# Patient Record
Sex: MALE | Race: WHITE | NOT HISPANIC OR LATINO | Employment: PART TIME | URBAN - METROPOLITAN AREA
[De-identification: names, ages, dates, MRNs, and addresses within clinical notes are randomized per-mention and may not be internally consistent; named-entity substitution may affect disease eponyms.]

---

## 2018-06-14 ENCOUNTER — HOSPITAL ENCOUNTER (EMERGENCY)
Facility: HOSPITAL | Age: 83
Discharge: HOME | End: 2018-06-14
Attending: EMERGENCY MEDICINE
Payer: MEDICARE

## 2018-06-14 VITALS
SYSTOLIC BLOOD PRESSURE: 143 MMHG | DIASTOLIC BLOOD PRESSURE: 79 MMHG | HEART RATE: 88 BPM | WEIGHT: 194 LBS | OXYGEN SATURATION: 97 % | HEIGHT: 67 IN | RESPIRATION RATE: 18 BRPM | TEMPERATURE: 99 F | BODY MASS INDEX: 30.45 KG/M2

## 2018-06-14 DIAGNOSIS — M19.041 OSTEOARTHRITIS OF RIGHT HAND, UNSPECIFIED OSTEOARTHRITIS TYPE: Primary | ICD-10-CM

## 2018-06-14 DIAGNOSIS — M79.641 RIGHT HAND PAIN: ICD-10-CM

## 2018-06-14 PROBLEM — M51.36 DEGENERATION OF LUMBAR INTERVERTEBRAL DISC: Status: ACTIVE | Noted: 2018-06-14

## 2018-06-14 PROBLEM — E11.9 DIABETES MELLITUS: Status: ACTIVE | Noted: 2018-06-14

## 2018-06-14 PROBLEM — E78.5 HYPERLIPIDEMIA: Status: ACTIVE | Noted: 2018-06-14

## 2018-06-14 PROBLEM — I10 HYPERTENSION: Status: ACTIVE | Noted: 2018-06-14

## 2018-06-14 PROCEDURE — 99283 EMERGENCY DEPT VISIT LOW MDM: CPT

## 2018-06-14 RX ORDER — CELECOXIB 200 MG/1
200 CAPSULE ORAL 2 TIMES DAILY
COMMUNITY
Start: 2017-06-27

## 2018-06-14 RX ORDER — SAXAGLIPTIN AND METFORMIN HYDROCHLORIDE 500; 5 MG/1; MG/1
TABLET, FILM COATED, EXTENDED RELEASE ORAL
COMMUNITY
Start: 2017-06-20

## 2018-06-14 RX ORDER — ATORVASTATIN CALCIUM 40 MG/1
40 TABLET, FILM COATED ORAL DAILY
COMMUNITY
Start: 2018-04-09 | End: 2022-03-06 | Stop reason: ALTCHOICE

## 2018-06-14 RX ORDER — ESCITALOPRAM OXALATE 10 MG/1
10 TABLET ORAL DAILY
COMMUNITY
End: 2018-06-14 | Stop reason: DRUGHIGH

## 2018-06-14 RX ORDER — METOPROLOL TARTRATE 50 MG/1
50 TABLET ORAL 2 TIMES DAILY
COMMUNITY

## 2018-06-14 RX ORDER — FLUTICASONE PROPIONATE 50 MCG
2 SPRAY, SUSPENSION (ML) NASAL DAILY
COMMUNITY
Start: 2018-06-07

## 2018-06-14 RX ORDER — GLIPIZIDE 10 MG/1
TABLET ORAL
COMMUNITY
Start: 2015-01-10

## 2018-06-14 RX ORDER — HYDROCODONE BITARTRATE AND ACETAMINOPHEN 5; 325 MG/1; MG/1
1 TABLET ORAL
Qty: 10 TABLET | Refills: 0 | Status: SHIPPED | OUTPATIENT
Start: 2018-06-14 | End: 2018-07-02

## 2018-06-14 RX ORDER — ASPIRIN 81 MG/1
81 TABLET ORAL DAILY
COMMUNITY

## 2018-06-14 RX ORDER — ESCITALOPRAM OXALATE 20 MG/1
20 TABLET ORAL DAILY
COMMUNITY
Start: 2018-06-13

## 2018-06-14 NOTE — ED PROVIDER NOTES
Encounter Date: 6/14/2018       History     Chief Complaint   Patient presents with    Hand Pain     Patient reports right hand pain for several days denies injury reports difficult to sleep and wanted it checked.        The history is provided by the patient.   Hand Injury    The incident occurred two days ago. The incident occurred at home. The injury mechanism is unknown (patient has history of arthritis). The pain is present in the right hand. The quality of the pain is described as throbbing and aching. The pain is at a severity of 8/10. The pain has been fluctuating since the incident. Pertinent negatives include no fever and no malaise/fatigue. The symptoms are aggravated by movement, use and palpation. He has tried NSAIDs (Celebrex) for the symptoms. The treatment provided no relief.       PCP:    Trey Eden MD        Review of patient's allergies indicates:   Allergen Reactions    Prednisone      Hiccups     Past Medical History:   Diagnosis Date    Depression     Diabetes mellitus     Hearing aid worn     Hypertension      Past Surgical History:   Procedure Laterality Date    TESTICLE SURGERY Left     removed the left testicle    TOTAL KNEE ARTHROPLASTY Left      History reviewed. No pertinent family history.  Social History   Substance Use Topics    Smoking status: Never Smoker    Smokeless tobacco: Never Used    Alcohol use No     Review of Systems   Constitutional: Negative for fever and malaise/fatigue.   HENT: Negative for congestion and sore throat.    Respiratory: Negative for chest tightness and shortness of breath.    Cardiovascular: Negative for chest pain.   Gastrointestinal: Negative for abdominal pain and nausea.   Genitourinary: Negative for dysuria.   Musculoskeletal: Positive for arthralgias (right hand). Negative for back pain.   Skin: Negative for rash and wound.   Neurological: Negative for weakness and headaches.   Hematological: Does not bruise/bleed easily.        Physical Exam     Initial Vitals [06/14/18 1349]   BP Pulse Resp Temp SpO2   (!) 140/75 91 18 98.8 °F (37.1 °C) 98 %      MAP       --         Physical Exam    Nursing note and vitals reviewed.  Constitutional: He appears well-developed and well-nourished. He is cooperative. He does not appear ill. No distress.   HENT:   Head: Normocephalic and atraumatic.   Right Ear: External ear normal.   Left Ear: External ear normal.   Nose: Nose normal.   Mouth/Throat: Uvula is midline, oropharynx is clear and moist and mucous membranes are normal.   Eyes: Conjunctivae, EOM and lids are normal. Pupils are equal, round, and reactive to light.   Neck: Trachea normal and normal range of motion. Neck supple.   Cardiovascular: Normal rate, regular rhythm, intact distal pulses and normal pulses.   Pulmonary/Chest: Effort normal. No accessory muscle usage. No respiratory distress.   Abdominal: Soft. He exhibits no distension and no mass. There is no tenderness. There is no rebound and no guarding.   Musculoskeletal: He exhibits no edema.        Right hand: He exhibits decreased range of motion (secondary to pain), tenderness (generalized tenderness - no crepitus or obvious deformity noted - mild swelling noted to dorsal surface - neurovascular intact distally), bony tenderness and swelling (mild swelling noted near 1st and 2nd metacarpals). He exhibits normal capillary refill. Normal sensation noted.        Hands:  Neurological: He is alert and oriented to person, place, and time. He has normal strength. Gait normal. GCS eye subscore is 4. GCS verbal subscore is 5. GCS motor subscore is 6.   Neurovascular intact to all extremities.    Skin: Skin is warm, dry and intact. Capillary refill takes less than 2 seconds. No abrasion, no bruising, no ecchymosis and no rash noted.   Psychiatric: He has a normal mood and affect. His speech is normal and behavior is normal. Cognition and memory are normal.         ED Course   Splint  "Application  Date/Time: 2018 2:45 PM  Performed by: PERLA VARGAS  Authorized by: PERLA VARGAS   Consent Done: Yes  Consent: Verbal consent obtained.  Risks and benefits: risks, benefits and alternatives were discussed  Consent given by: patient  Site marked: the operative site was marked  Imaging studies: imaging studies available  Patient identity confirmed: , MRN, name and verbally with patient  Time out: Immediately prior to procedure a "time out" was called to verify the correct patient, procedure, equipment, support staff and site/side marked as required.  Location details: right hand  Splint type: ace bandage.  Supplies used: elastic bandage  Post-procedure: The splinted body part was neurovascularly unchanged following the procedure.  Patient tolerance: Patient tolerated the procedure well with no immediate complications             ED Imaging Results:   Imaging Results          X-Ray Hand 3 view Right (Final result)  Result time 18 14:34:20    Final result by ELIO Montoya Sr., MD (18 14:34:20)                 Impression:      1. There are moderate osteoarthritic changes in the 1st metacarpocarpal joint.  2. There is no acute fracture visualized.  3. There is a 2 mm well corticated osseous density projected medial to the distal interphalangeal joint of the right index finger.  This is characteristic of an old fracture fragment or an unfused accessory ossification center.      Electronically signed by: John Montoya MD  Date:    2018  Time:    14:34             Narrative:    EXAMINATION:  XR HAND COMPLETE 3 VIEW RIGHT    CLINICAL HISTORY:  Right Hand Pain;    COMPARISON:  None    FINDINGS:  There is no acute fracture visualized.  There is a 2 mm well corticated osseous density projected medial to the distal interphalangeal joint of the right index finger.  There is no dislocation.  There are moderate osteoarthritic changes in the 1st metacarpocarpal joint.      " "                          ED Course Vitals  Vitals:    06/14/18 1349 06/14/18 1452   BP: (!) 140/75 (!) 143/79   BP Location: Left arm Left arm   Patient Position: Sitting Sitting   Pulse: 91 88   Resp: 18 18   Temp: 98.8 °F (37.1 °C) 99.3 °F (37.4 °C)   TempSrc: Oral Oral   SpO2: 98% 97%   Weight: 88 kg (194 lb)    Height: 5' 7" (1.702 m)          1445 HOURS RE-EVALUATION & DISPOSITION:   Reassessment at the time of disposition demonstrates that the patient is resting comfortably in no acute distress.  He has remained hemodynamically stable throughout the entire ED visit and is without objective evidence for acute process requiring urgent intervention or hospitalization. I discussed test results and provided counseling to patient with regard to condition, the treatment plan, specific conditions for return, and the importance of follow up.  Answered questions at this time. The patient is stable for discharge.       X-Rays:   Independently Interpreted Readings:   Other Readings:  Radiographs of the right hand reveal no acute findings - osteoarthritic changes noted per radiologist.       Medical Decision Making:   Clinical Tests:   Radiological Study: Ordered and Reviewed                      Clinical Impression:       ICD-10-CM ICD-9-CM   1. Osteoarthritis of right hand, unspecified osteoarthritis type M19.041 715.94   2. Right hand pain M79.641 729.5           Disposition:   Disposition: Discharged  Condition: Stable  I discussed with patient that the evaluation in the emergency department does not suggest any emergent or life threatening medical condition requiring immediate intervention beyond what was provided in the ED, and I believe patient is safe for discharge.  Regardless, an unremarkable evaluation in the ED does not preclude the development or presence of a serious of life threatening condition. As such, patient was instructed to return immediately for any worsening or change in current symptoms. I also " discussed the results of my evaluation and diagnostics with patient and he concurs with the evaluation and management plan.  Detailed written and verbal instructions provided to patient and he expressed a verbal understanding of the discharge instructions and overall management plan. Reiterated the importance of medication administration and safety and advised patient to follow up with primary care provider in 3-5 days or sooner if needed.  Also advised patient to return to the ER for any complications.     Regarding OSTEOARTHRITIS, for treatment, I advised patient to: take over-the-counter acetaminophen, ibuprofen, or aspirin for pain if indicated; limit activities and get plenty of rest when in pain; avoid motions and activities that cause strain on your joints, such as heavy exercise and lifting; when picking things up, bend at the hips and knees; do not twist your back while lifting; use crutches, a cane, or a walker if weak or have unsteady gait; decrease stress and strain on the affected joint; wear low-heeled shoes to decrease the pain of arthritic toes and feet and strain on the ankle, knee, and hip joint; participate in muscle strengthening exercises when tolerable; maintain/obtain healthy weight; sleep on a firm mattress or put a 1/2 to 1 inch piece of plywood between the mattress and box springs; sleep on back with a pillow under knees to decrease tension on back or in a position of comfort; use a heating pad (turned on low) to decrease pain and swelling for 15 to 20 minutes hourly;  massage the muscles around the joint to relieve pain and stiffness; and avoid cold temperatures or outdoor activity in cold weather or cold temperatures. I also informed patient to take all medications as prescribed and to always bring current list and bottles of medications to ED as well as visits to primary care provider or specialist.           Discharge Medication List as of 6/14/2018  2:47 PM      START taking these  medications    Details   HYDROcodone-acetaminophen (NORCO) 5-325 mg per tablet Take 1 tablet by mouth every 4 to 6 hours as needed for Pain., Starting Thu 6/14/2018, Print             Follow-up Information     Call  Trey Eden MD.    Specialty:  Internal Medicine  Why:  If symptoms worsen or as needed  Contact information:  [id number] [facility] [address]  [phone]                                Vimal Foley NP  06/14/18 9847

## 2018-06-14 NOTE — DISCHARGE INSTRUCTIONS
The radiographs revealed no obvious fracture or dislocation.     Continue taking the Celebrex as prescribed by Dr. Eden.    Do not drive or operate heavy machinery after taking pain medication.  Only take as needed.

## 2018-07-02 ENCOUNTER — HOSPITAL ENCOUNTER (EMERGENCY)
Facility: HOSPITAL | Age: 83
Discharge: HOME | End: 2018-07-03
Attending: EMERGENCY MEDICINE
Payer: MEDICARE

## 2018-07-02 DIAGNOSIS — R79.89 ELEVATED TROPONIN: ICD-10-CM

## 2018-07-02 DIAGNOSIS — Z76.0 REPEAT PRESCRIPTION ISSUE: ICD-10-CM

## 2018-07-02 DIAGNOSIS — R00.0 RAPID HEART RATE: ICD-10-CM

## 2018-07-02 DIAGNOSIS — I47.10 SVT (SUPRAVENTRICULAR TACHYCARDIA): Primary | ICD-10-CM

## 2018-07-02 LAB
ALBUMIN SERPL BCP-MCNC: 3.9 G/DL
ALP SERPL-CCNC: 107 U/L
ALT SERPL W/O P-5'-P-CCNC: 20 U/L
ANION GAP SERPL CALC-SCNC: 7 MMOL/L
AST SERPL-CCNC: 23 U/L
BASOPHILS # BLD AUTO: 0.03 K/UL
BASOPHILS NFR BLD: 0.3 %
BILIRUB SERPL-MCNC: 1.7 MG/DL
BILIRUB UR QL STRIP: NEGATIVE
BNP SERPL-MCNC: 202 PG/ML
BUN SERPL-MCNC: 18 MG/DL
CALCIUM SERPL-MCNC: 9.9 MG/DL
CHLORIDE SERPL-SCNC: 105 MMOL/L
CK SERPL-CCNC: 222 U/L
CLARITY UR REFRACT.AUTO: CLEAR
CO2 SERPL-SCNC: 24 MMOL/L
COLOR UR AUTO: YELLOW
CREAT SERPL-MCNC: 1.3 MG/DL
DIFFERENTIAL METHOD: ABNORMAL
EOSINOPHIL # BLD AUTO: 0.2 K/UL
EOSINOPHIL NFR BLD: 1.8 %
ERYTHROCYTE [DISTWIDTH] IN BLOOD BY AUTOMATED COUNT: 13.9 %
EST. GFR  (AFRICAN AMERICAN): 57.1 ML/MIN/1.73 M^2
EST. GFR  (NON AFRICAN AMERICAN): 49.4 ML/MIN/1.73 M^2
GLUCOSE SERPL-MCNC: 169 MG/DL
GLUCOSE UR QL STRIP: NEGATIVE
HCT VFR BLD AUTO: 40.5 %
HGB BLD-MCNC: 13.4 G/DL
HGB UR QL STRIP: NEGATIVE
KETONES UR QL STRIP: NEGATIVE
LEUKOCYTE ESTERASE UR QL STRIP: NEGATIVE
LYMPHOCYTES # BLD AUTO: 1.8 K/UL
LYMPHOCYTES NFR BLD: 19.4 %
MCH RBC QN AUTO: 27.9 PG
MCHC RBC AUTO-ENTMCNC: 33.1 G/DL
MCV RBC AUTO: 84 FL
MONOCYTES # BLD AUTO: 1 K/UL
MONOCYTES NFR BLD: 11.4 %
NEUTROPHILS # BLD AUTO: 6.1 K/UL
NEUTROPHILS NFR BLD: 66.8 %
NITRITE UR QL STRIP: NEGATIVE
PH UR STRIP: 7 [PH] (ref 5–8)
PLATELET # BLD AUTO: 233 K/UL
PMV BLD AUTO: 11.6 FL
POTASSIUM SERPL-SCNC: 4.3 MMOL/L
PROT SERPL-MCNC: 8 G/DL
PROT UR QL STRIP: NEGATIVE
RBC # BLD AUTO: 4.8 M/UL
SODIUM SERPL-SCNC: 136 MMOL/L
SP GR UR STRIP: 1.01 (ref 1–1.03)
TROPONIN I SERPL DL<=0.01 NG/ML-MCNC: 0.44 NG/ML
TROPONIN I SERPL DL<=0.01 NG/ML-MCNC: 0.48 NG/ML
URN SPEC COLLECT METH UR: ABNORMAL
UROBILINOGEN UR STRIP-ACNC: ABNORMAL EU/DL
WBC # BLD AUTO: 9.06 K/UL

## 2018-07-02 PROCEDURE — 83880 ASSAY OF NATRIURETIC PEPTIDE: CPT

## 2018-07-02 PROCEDURE — 96361 HYDRATE IV INFUSION ADD-ON: CPT

## 2018-07-02 PROCEDURE — 63600175 PHARM REV CODE 636 W HCPCS: Performed by: EMERGENCY MEDICINE

## 2018-07-02 PROCEDURE — 93010 ELECTROCARDIOGRAM REPORT: CPT | Mod: 77,,, | Performed by: INTERNAL MEDICINE

## 2018-07-02 PROCEDURE — 84484 ASSAY OF TROPONIN QUANT: CPT

## 2018-07-02 PROCEDURE — 85025 COMPLETE CBC W/AUTO DIFF WBC: CPT

## 2018-07-02 PROCEDURE — 99900035 HC TECH TIME PER 15 MIN (STAT)

## 2018-07-02 PROCEDURE — 96374 THER/PROPH/DIAG INJ IV PUSH: CPT

## 2018-07-02 PROCEDURE — 93010 ELECTROCARDIOGRAM REPORT: CPT | Mod: ,,, | Performed by: NUCLEAR MEDICINE

## 2018-07-02 PROCEDURE — 99284 EMERGENCY DEPT VISIT MOD MDM: CPT | Mod: 25

## 2018-07-02 PROCEDURE — 84484 ASSAY OF TROPONIN QUANT: CPT | Mod: 91

## 2018-07-02 PROCEDURE — 93005 ELECTROCARDIOGRAM TRACING: CPT

## 2018-07-02 PROCEDURE — 96375 TX/PRO/DX INJ NEW DRUG ADDON: CPT | Mod: 59

## 2018-07-02 PROCEDURE — 82550 ASSAY OF CK (CPK): CPT

## 2018-07-02 PROCEDURE — 81003 URINALYSIS AUTO W/O SCOPE: CPT

## 2018-07-02 PROCEDURE — 80053 COMPREHEN METABOLIC PANEL: CPT

## 2018-07-02 PROCEDURE — 25500020 PHARM REV CODE 255: Performed by: EMERGENCY MEDICINE

## 2018-07-02 PROCEDURE — 25000003 PHARM REV CODE 250: Performed by: EMERGENCY MEDICINE

## 2018-07-02 PROCEDURE — 96376 TX/PRO/DX INJ SAME DRUG ADON: CPT

## 2018-07-02 RX ORDER — DILTIAZEM HYDROCHLORIDE 5 MG/ML
15 INJECTION INTRAVENOUS
Status: DISCONTINUED | OUTPATIENT
Start: 2018-07-02 | End: 2018-07-02

## 2018-07-02 RX ORDER — ADENOSINE 3 MG/ML
6 INJECTION, SOLUTION INTRAVENOUS
Status: COMPLETED | OUTPATIENT
Start: 2018-07-02 | End: 2018-07-02

## 2018-07-02 RX ORDER — AMIODARONE HYDROCHLORIDE 150 MG/3ML
150 INJECTION, SOLUTION INTRAVENOUS
Status: COMPLETED | OUTPATIENT
Start: 2018-07-02 | End: 2018-07-02

## 2018-07-02 RX ORDER — SODIUM CHLORIDE 9 MG/ML
1000 INJECTION, SOLUTION INTRAVENOUS
Status: COMPLETED | OUTPATIENT
Start: 2018-07-02 | End: 2018-07-02

## 2018-07-02 RX ORDER — DILTIAZEM HCL 1 MG/ML
5 INJECTION, SOLUTION INTRAVENOUS CONTINUOUS
Status: DISCONTINUED | OUTPATIENT
Start: 2018-07-02 | End: 2018-07-02

## 2018-07-02 RX ADMIN — ADENOSINE 6 MG: 3 INJECTION, SOLUTION INTRAVENOUS at 05:07

## 2018-07-02 RX ADMIN — SODIUM CHLORIDE 1000 ML: 0.9 INJECTION, SOLUTION INTRAVENOUS at 04:07

## 2018-07-02 RX ADMIN — IOHEXOL 100 ML: 350 INJECTION, SOLUTION INTRAVENOUS at 07:07

## 2018-07-02 RX ADMIN — SODIUM CHLORIDE 1000 ML: 0.9 INJECTION, SOLUTION INTRAVENOUS at 05:07

## 2018-07-02 RX ADMIN — AMIODARONE HYDROCHLORIDE 150 MG: 50 INJECTION, SOLUTION INTRAVENOUS at 05:07

## 2018-07-02 NOTE — ED PROVIDER NOTES
Encounter Date: 7/2/2018       History     Chief Complaint   Patient presents with    Tachycardia     pt at pcp dr. ortiz today for right elbow infection and sent over to cardiology due to elevated heart rate.     The history is provided by the patient.   Palpitations    This is a new problem. The current episode started several weeks ago. The problem occurs constantly. The problem has been unchanged. The problem is associated with an unknown factor. Associated symptoms include malaise/fatigue and weakness. Pertinent negatives include no fever, no chest pain, no exertional chest pressure, no nausea, no headaches, no back pain and no shortness of breath. He has tried nothing for the symptoms.     Review of patient's allergies indicates:   Allergen Reactions    Prednisone      Hiccups     Past Medical History:   Diagnosis Date    Coronary artery disease     Depression     Diabetes mellitus     Hearing aid worn     High cholesterol     Hypertension     Sleep apnea      Past Surgical History:   Procedure Laterality Date    CORONARY ARTERY BYPASS GRAFT      2009    TESTICLE SURGERY Left     removed the left testicle    TOTAL KNEE ARTHROPLASTY Left      History reviewed. No pertinent family history.  Social History   Substance Use Topics    Smoking status: Never Smoker    Smokeless tobacco: Never Used    Alcohol use No     Review of Systems   Constitutional: Positive for malaise/fatigue. Negative for fever.   HENT: Negative for sore throat.    Respiratory: Negative for shortness of breath.    Cardiovascular: Positive for palpitations. Negative for chest pain.   Gastrointestinal: Negative for nausea.   Genitourinary: Negative for dysuria.   Musculoskeletal: Negative for back pain.   Skin: Negative for rash.   Neurological: Positive for weakness. Negative for headaches.   Hematological: Does not bruise/bleed easily.       Physical Exam     Initial Vitals [07/02/18 1629]   BP Pulse Resp Temp SpO2   (!)  145/103 (!) 139 20 98.8 °F (37.1 °C) 98 %      MAP       --         Physical Exam    Nursing note and vitals reviewed.  Constitutional: He appears well-developed and well-nourished. No distress.   Elderly     HENT:   Head: Normocephalic and atraumatic.   Mouth/Throat: Oropharynx is clear and moist.   Eyes: Conjunctivae and EOM are normal. Pupils are equal, round, and reactive to light.   Neck: Normal range of motion. Neck supple.   Cardiovascular: Regular rhythm and normal heart sounds. Tachycardia present.  Exam reveals no gallop and no friction rub.    No murmur heard.  Pulmonary/Chest: Breath sounds normal. No respiratory distress. He has no wheezes. He has no rhonchi. He has no rales.   Abdominal: Soft. Bowel sounds are normal. He exhibits no distension and no mass. There is no tenderness. There is no rebound and no guarding.   Musculoskeletal: Normal range of motion. He exhibits no edema.   Neurological: He is alert and oriented to person, place, and time. He has normal strength.   Skin: Skin is warm and dry. No rash noted.   Psychiatric: He has a normal mood and affect. Thought content normal.         ED Course   Procedures  Labs Reviewed   CBC W/ AUTO DIFFERENTIAL - Abnormal; Notable for the following:        Result Value    Hemoglobin 13.4 (*)     All other components within normal limits   COMPREHENSIVE METABOLIC PANEL - Abnormal; Notable for the following:     Glucose 169 (*)     Total Bilirubin 1.7 (*)     Anion Gap 7 (*)     eGFR if  57.1 (*)     eGFR if non  49.4 (*)     All other components within normal limits   URINALYSIS, REFLEX TO URINE CULTURE - Abnormal; Notable for the following:     Urobilinogen, UA 2.0-3.0 (*)     All other components within normal limits    Narrative:     Preferred Collection Type->Urine, Clean Catch   B-TYPE NATRIURETIC PEPTIDE - Abnormal; Notable for the following:      (*)     All other components within normal limits   CK - Abnormal;  Notable for the following:      (*)     All other components within normal limits   TROPONIN I - Abnormal; Notable for the following:     Troponin I 0.479 (*)     All other components within normal limits   TROPONIN I - Abnormal; Notable for the following:     Troponin I 0.444 (*)     All other components within normal limits     EKG Readings: (Independently Interpreted)   Rhythm: Supraventricular Tachycardia (SVT).   Other EKG Interpretations: Repeat EKG at 17:13 A Fib following adenosine, then sped up to 130's-140's       Imaging Results          CTA Chest Non-Coronary (PE Study) (Final result)  Result time 07/02/18 20:05:16    Final result by John Bennett MD (07/02/18 20:05:16)                 Impression:      1. Negative for pulmonary embolism.  2. 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  3. Cardiomegaly.  4. Bilateral renal cysts.  All CT scans at this facility use dose modulation, iterative reconstruction and/or weight based dosing when appropriate to reduce radiation dose to as low as reasonably achievable.      Electronically signed by: John Bennett MD  Date:    07/02/2018  Time:    20:05             Narrative:    EXAMINATION:  CTA CHEST NON CORONARY    CLINICAL HISTORY:  dyspnea;    TECHNIQUE:  Low dose axial images, sagittal and coronal reformations were obtained from the thoracic inlet to the lung bases following administration of intravenous contrast.  Contrast timing was optimized to evaluate the pulmonary arteries.  3D reformatted images were performed, reviewed and archived.    COMPARISON:  None    FINDINGS:  Exam is mildly degraded due to respiratory motion artifact.  There is no evidence of pulmonary embolus.    The heart is normal in size.  There is atherosclerotic change of the aorta with bulbous, focal aneurysmal dilatation of the posterior aspect of the aortic arch measuring up to 3.8 cm in  maximal cross-sectional dimension.  The arch then tapers to normal caliber with the mid descending thoracic aorta measuring up to 2 point 9 cm in maximal cross-sectional dimension.  Although the aorta is not well opacified, there is no evidence of dissection.  The heart is enlarged.  No pathologic adenopathy identified throughout the chest.    Right lung is clear.  The left lung is clear.    Visualized upper abdomen demonstrates a normal appearance of the liver, gallbladder, spleen, pancreas and adrenal glands.  Bilateral renal cysts are present.    Degenerative changes of the spine are present.                               X-Ray Chest AP Portable (Final result)  Result time 07/02/18 17:24:45    Final result by John Bennett MD (07/02/18 17:24:45)                 Impression:      Cardiomegaly.  No acute findings.      Electronically signed by: John Bennett MD  Date:    07/02/2018  Time:    17:24             Narrative:    EXAMINATION:  XR CHEST AP PORTABLE    CLINICAL HISTORY:  palpitations;    COMPARISON:  None    FINDINGS:  Lungs are clear.  Cardiac silhouette is enlarged with postoperative changes.Moderate levoscoliosis of the thoracolumbar spine is present..                              ED Vital Signs:  Vitals:    07/02/18 1737 07/02/18 1801 07/02/18 1831 07/02/18 1901   BP: 137/79 122/69 136/69 137/75   Pulse: 83 75 75 79   Resp: 18 14 20 17   Temp:       TempSrc:       SpO2: 99% 99% 98% 98%   Weight:       Height:        07/02/18 1949 07/02/18 2002 07/02/18 2031 07/02/18 2101   BP: 135/78 (!) 145/72 127/68 138/72   Pulse: (!) 133 82 76 79   Resp: 18 18 13 18   Temp:       TempSrc:       SpO2: (!) 88% 98% 98% 98%   Weight:       Height:        07/02/18 2131 07/02/18 2201 07/02/18 2231 07/02/18 2300   BP: (!) 141/73 136/70 (!) 144/81    Pulse: 77 78 78    Resp: 18 20 18 20   Temp:   98.2 °F (36.8 °C)    TempSrc:       SpO2: 98% 99% 99%    Weight:       Height:        07/02/18 2302 07/02/18 2332  07/03/18 0002   BP: 135/68 (!) 177/81 (!) 141/72   Pulse: 74 97 77   Resp: 20 18 18   Temp:      TempSrc:      SpO2: 96% 98% 99%   Weight:      Height:            Abnormal Lab Results:  Labs Reviewed   CBC W/ AUTO DIFFERENTIAL - Abnormal; Notable for the following:        Result Value    Hemoglobin 13.4 (*)     All other components within normal limits   COMPREHENSIVE METABOLIC PANEL - Abnormal; Notable for the following:     Glucose 169 (*)     Total Bilirubin 1.7 (*)     Anion Gap 7 (*)     eGFR if  57.1 (*)     eGFR if non  49.4 (*)     All other components within normal limits   URINALYSIS, REFLEX TO URINE CULTURE - Abnormal; Notable for the following:     Urobilinogen, UA 2.0-3.0 (*)     All other components within normal limits    Narrative:     Preferred Collection Type->Urine, Clean Catch   B-TYPE NATRIURETIC PEPTIDE - Abnormal; Notable for the following:      (*)     All other components within normal limits   CK - Abnormal; Notable for the following:      (*)     All other components within normal limits   TROPONIN I - Abnormal; Notable for the following:     Troponin I 0.479 (*)     All other components within normal limits   TROPONIN I - Abnormal; Notable for the following:     Troponin I 0.444 (*)     All other components within normal limits          All Lab Results:  Results for orders placed or performed during the hospital encounter of 07/02/18   CBC auto differential   Result Value Ref Range    WBC 9.06 3.90 - 12.70 K/uL    RBC 4.80 4.60 - 6.20 M/uL    Hemoglobin 13.4 (L) 14.0 - 18.0 g/dL    Hematocrit 40.5 40.0 - 54.0 %    MCV 84 82 - 98 fL    MCH 27.9 27.0 - 31.0 pg    MCHC 33.1 32.0 - 36.0 g/dL    RDW 13.9 11.5 - 14.5 %    Platelets 233 150 - 350 K/uL    MPV 11.6 9.2 - 12.9 fL    Gran # (ANC) 6.1 1.8 - 7.7 K/uL    Lymph # 1.8 1.0 - 4.8 K/uL    Mono # 1.0 0.3 - 1.0 K/uL    Eos # 0.2 0.0 - 0.5 K/uL    Baso # 0.03 0.00 - 0.20 K/uL    Gran% 66.8 38.0 -  73.0 %    Lymph% 19.4 18.0 - 48.0 %    Mono% 11.4 4.0 - 15.0 %    Eosinophil% 1.8 0.0 - 8.0 %    Basophil% 0.3 0.0 - 1.9 %    Differential Method Automated    Comprehensive metabolic panel   Result Value Ref Range    Sodium 136 136 - 145 mmol/L    Potassium 4.3 3.5 - 5.1 mmol/L    Chloride 105 95 - 110 mmol/L    CO2 24 23 - 29 mmol/L    Glucose 169 (H) 70 - 110 mg/dL    BUN, Bld 18 8 - 23 mg/dL    Creatinine 1.3 0.5 - 1.4 mg/dL    Calcium 9.9 8.7 - 10.5 mg/dL    Total Protein 8.0 6.0 - 8.4 g/dL    Albumin 3.9 3.5 - 5.2 g/dL    Total Bilirubin 1.7 (H) 0.1 - 1.0 mg/dL    Alkaline Phosphatase 107 55 - 135 U/L    AST 23 10 - 40 U/L    ALT 20 10 - 44 U/L    Anion Gap 7 (L) 8 - 16 mmol/L    eGFR if African American 57.1 (A) >60 mL/min/1.73 m^2    eGFR if non  49.4 (A) >60 mL/min/1.73 m^2   Urinalysis, Reflex to Urine Culture Urine, Clean Catch   Result Value Ref Range    Specimen UA Urine, Clean Catch     Color, UA Yellow Yellow, Straw, Samina    Appearance, UA Clear Clear    pH, UA 7.0 5.0 - 8.0    Specific Gravity, UA 1.010 1.005 - 1.030    Protein, UA Negative Negative    Glucose, UA Negative Negative    Ketones, UA Negative Negative    Bilirubin (UA) Negative Negative    Occult Blood UA Negative Negative    Nitrite, UA Negative Negative    Urobilinogen, UA 2.0-3.0 (A) <2.0 EU/dL    Leukocytes, UA Negative Negative   Brain natriuretic peptide   Result Value Ref Range     (H) 0 - 99 pg/mL   CK   Result Value Ref Range     (H) 20 - 200 U/L   Troponin I   Result Value Ref Range    Troponin I 0.479 (H) 0.000 - 0.026 ng/mL   Troponin I   Result Value Ref Range    Troponin I 0.444 (H) 0.000 - 0.026 ng/mL           Imaging Results:  Imaging Results          CTA Chest Non-Coronary (PE Study) (Final result)  Result time 07/02/18 20:05:16    Final result by John Bennett MD (07/02/18 20:05:16)                 Impression:      1. Negative for pulmonary embolism.  2. Focal bulbous aneurysmal  dilatation the posterior aortic arch measuring up to 3.8 cm in maximal cross-section dimension.  3. Cardiomegaly.  4. Bilateral renal cysts.  All CT scans at this facility use dose modulation, iterative reconstruction and/or weight based dosing when appropriate to reduce radiation dose to as low as reasonably achievable.      Electronically signed by: John Bennett MD  Date:    07/02/2018  Time:    20:05             Narrative:    EXAMINATION:  CTA CHEST NON CORONARY    CLINICAL HISTORY:  dyspnea;    TECHNIQUE:  Low dose axial images, sagittal and coronal reformations were obtained from the thoracic inlet to the lung bases following administration of intravenous contrast.  Contrast timing was optimized to evaluate the pulmonary arteries.  3D reformatted images were performed, reviewed and archived.    COMPARISON:  None    FINDINGS:  Exam is mildly degraded due to respiratory motion artifact.  There is no evidence of pulmonary embolus.    The heart is normal in size.  There is atherosclerotic change of the aorta with bulbous, focal aneurysmal dilatation of the posterior aspect of the aortic arch measuring up to 3.8 cm in maximal cross-sectional dimension.  The arch then tapers to normal caliber with the mid descending thoracic aorta measuring up to 2 point 9 cm in maximal cross-sectional dimension.  Although the aorta is not well opacified, there is no evidence of dissection.  The heart is enlarged.  No pathologic adenopathy identified throughout the chest.    Right lung is clear.  The left lung is clear.    Visualized upper abdomen demonstrates a normal appearance of the liver, gallbladder, spleen, pancreas and adrenal glands.  Bilateral renal cysts are present.    Degenerative changes of the spine are present.                               X-Ray Chest AP Portable (Final result)  Result time 07/02/18 17:24:45    Final result by John Bennett MD (07/02/18 17:24:45)                 Impression:       Cardiomegaly.  No acute findings.      Electronically signed by: John Bennett MD  Date:    07/02/2018  Time:    17:24             Narrative:    EXAMINATION:  XR CHEST AP PORTABLE    CLINICAL HISTORY:  palpitations;    COMPARISON:  None    FINDINGS:  Lungs are clear.  Cardiac silhouette is enlarged with postoperative changes.Moderate levoscoliosis of the thoracolumbar spine is present..                               The Emergency Provider reviewed the vital signs and test results, which are outlined above.    ED Discussions:  5:31 PM: Re-evaluated pt. I have discussed test results, shared treatment plan, and the need for admission with patient and family at bedside. Pt and family express understanding at this time and agree with all information. All questions answered. Pt and family have no further questions or concerns at this time. Pt is ready for admit.    17:38 Patient spontaneously converted to a rate of 85 with a NSR Will call his cardiologist    17:40 Discussed case with Dr. Callahan (cardiology) Asked that I give the patient Amiodarone IV and discharge to follow up with him this week. He has already called in prescription of amiodarone and Eliquis for the patient.     17:45  While discussing discharge and plan of care per Dr. Callahan, the patient's heart rate increased to 130's again.  Will give IV amiodarone and monitor heart rate and rhythm.      The patient was received from the off-going emergency room physician Dr Pelayo at 6:00PM.  All pertinent details presently available from the patient encounter were discussed along with the expected plan for disposition.      Patient has maintained an appropriate NSR since the transition and there have been no recurrent symptoms.  Repeat troponin is unremarkable.  We will proceed with plans per his cardiologist for outpatient FU with Eliquis and Amiodarone.                            Clinical Impression:       ICD-10-CM ICD-9-CM   1. SVT (supraventricular  tachycardia) I47.1 427.89   2. Rapid heart rate R00.0 785.0   3. Repeat prescription issue Z76.0 V68.1   4. Elevated troponin R74.8 790.6                            Bryn Hope MD  07/03/18 0259

## 2018-07-02 NOTE — ED NOTES
Spoke w/ Feli, house supervisor at [facility]. Informed her records request. Consent signed by patient. Patient reports he has never been in the hospital in [place] and he normally sees Dr. Callahan at his clinic here in [place]. Feli states she doesn't have any information regarding patient in her system but she will reach out to medical records and see if they can help. Will return call here afterwards.

## 2018-07-03 VITALS
SYSTOLIC BLOOD PRESSURE: 141 MMHG | WEIGHT: 186.19 LBS | BODY MASS INDEX: 29.22 KG/M2 | HEART RATE: 77 BPM | TEMPERATURE: 98 F | DIASTOLIC BLOOD PRESSURE: 72 MMHG | OXYGEN SATURATION: 99 % | RESPIRATION RATE: 18 BRPM | HEIGHT: 67 IN

## 2018-07-03 NOTE — ED NOTES
Received call back from Feli, nursing supervisor at [facility], who states they are unable to access medical records. Dr. Hope notified and states OK to hold off on retrieving records until tomorrow as patient is being admitted to the hospital anyway.

## 2018-07-03 NOTE — ED NOTES
Dr. Hope informed patient & family member at bedside of the need for transfer for higher level of care. Patient is requesting transfer to [facility].
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
## 2018-07-03 NOTE — ED NOTES
Spoke misti Person, house supervisor at [facility] who requests us to fax a copy of patient's facesheet over to her. She states as soon as she receives facesheet she will call back with accepting information.

## 2018-07-03 NOTE — ED NOTES
ANNETTE House supervisor currently not answering phone calls. Will continue to try to reach out to their facility. MD aware.

## 2018-08-13 DIAGNOSIS — I44.0 FIRST DEGREE ATRIOVENTRICULAR BLOCK: ICD-10-CM

## 2018-08-13 DIAGNOSIS — I25.10 CORONARY ATHEROSCLEROSIS OF NATIVE CORONARY ARTERY: Primary | ICD-10-CM

## 2018-08-14 ENCOUNTER — LAB VISIT (OUTPATIENT)
Dept: LAB | Facility: HOSPITAL | Age: 83
End: 2018-08-14
Attending: INTERNAL MEDICINE
Payer: MEDICARE

## 2018-08-14 DIAGNOSIS — I25.10 CORONARY ATHEROSCLEROSIS OF NATIVE CORONARY ARTERY: ICD-10-CM

## 2018-08-14 DIAGNOSIS — I44.0 FIRST DEGREE ATRIOVENTRICULAR BLOCK: ICD-10-CM

## 2018-08-14 DIAGNOSIS — I25.10 CORONARY ATHEROSCLEROSIS OF NATIVE CORONARY ARTERY: Primary | ICD-10-CM

## 2018-08-14 LAB
ALBUMIN SERPL BCP-MCNC: 3.8 G/DL
ALP SERPL-CCNC: 92 U/L
ALT SERPL W/O P-5'-P-CCNC: 21 U/L
ANION GAP SERPL CALC-SCNC: 7 MMOL/L
AST SERPL-CCNC: 26 U/L
BASOPHILS # BLD AUTO: 0.03 K/UL
BASOPHILS NFR BLD: 0.5 %
BILIRUB SERPL-MCNC: 1.3 MG/DL
BUN SERPL-MCNC: 15 MG/DL
CALCIUM SERPL-MCNC: 9.4 MG/DL
CHLORIDE SERPL-SCNC: 107 MMOL/L
CO2 SERPL-SCNC: 28 MMOL/L
CREAT SERPL-MCNC: 1.4 MG/DL
DIFFERENTIAL METHOD: ABNORMAL
EOSINOPHIL # BLD AUTO: 0.2 K/UL
EOSINOPHIL NFR BLD: 3.1 %
ERYTHROCYTE [DISTWIDTH] IN BLOOD BY AUTOMATED COUNT: 14.5 %
EST. GFR  (AFRICAN AMERICAN): 52.2 ML/MIN/1.73 M^2
EST. GFR  (NON AFRICAN AMERICAN): 45.2 ML/MIN/1.73 M^2
GLUCOSE SERPL-MCNC: 195 MG/DL
HCT VFR BLD AUTO: 39.4 %
HGB BLD-MCNC: 12.9 G/DL
LYMPHOCYTES # BLD AUTO: 1.6 K/UL
LYMPHOCYTES NFR BLD: 25.2 %
MAGNESIUM SERPL-MCNC: 2.1 MG/DL
MCH RBC QN AUTO: 28.5 PG
MCHC RBC AUTO-ENTMCNC: 32.7 G/DL
MCV RBC AUTO: 87 FL
MONOCYTES # BLD AUTO: 0.7 K/UL
MONOCYTES NFR BLD: 10.4 %
NEUTROPHILS # BLD AUTO: 3.8 K/UL
NEUTROPHILS NFR BLD: 60.3 %
PHOSPHATE SERPL-MCNC: 3.2 MG/DL
PLATELET # BLD AUTO: 171 K/UL
PMV BLD AUTO: 11.3 FL
POTASSIUM SERPL-SCNC: 4.8 MMOL/L
PROT SERPL-MCNC: 7.8 G/DL
RBC # BLD AUTO: 4.53 M/UL
SODIUM SERPL-SCNC: 142 MMOL/L
T4 FREE SERPL-MCNC: 0.99 NG/DL
TSH SERPL DL<=0.005 MIU/L-ACNC: 1.68 UIU/ML
WBC # BLD AUTO: 6.35 K/UL

## 2018-08-14 PROCEDURE — 85025 COMPLETE CBC W/AUTO DIFF WBC: CPT | Mod: PO

## 2018-08-14 PROCEDURE — 80053 COMPREHEN METABOLIC PANEL: CPT | Mod: PO

## 2018-08-14 PROCEDURE — 83735 ASSAY OF MAGNESIUM: CPT | Mod: PO

## 2018-08-14 PROCEDURE — 84480 ASSAY TRIIODOTHYRONINE (T3): CPT

## 2018-08-14 PROCEDURE — 84100 ASSAY OF PHOSPHORUS: CPT | Mod: PO

## 2018-08-14 PROCEDURE — 84439 ASSAY OF FREE THYROXINE: CPT | Mod: PO

## 2018-08-14 PROCEDURE — 36415 COLL VENOUS BLD VENIPUNCTURE: CPT | Mod: PO

## 2018-08-14 PROCEDURE — 84443 ASSAY THYROID STIM HORMONE: CPT | Mod: PO

## 2018-08-15 LAB — T3 SERPL-MCNC: 59 NG/DL

## 2019-04-16 ENCOUNTER — HOSPITAL ENCOUNTER (OUTPATIENT)
Dept: RADIOLOGY | Facility: HOSPITAL | Age: 84
Discharge: HOME | End: 2019-04-16
Attending: PAIN MEDICINE
Payer: MEDICARE

## 2019-04-16 DIAGNOSIS — M54.16 RADICULOPATHY, LUMBAR REGION: ICD-10-CM

## 2019-04-16 PROCEDURE — 72148 MRI LUMBAR SPINE W/O DYE: CPT | Mod: 26,,, | Performed by: RADIOLOGY

## 2019-04-16 PROCEDURE — 72148 MRI LUMBAR SPINE W/O DYE: CPT | Mod: TC,PO

## 2019-04-16 PROCEDURE — 72148 MRI LUMBAR SPINE WITHOUT CONTRAST: ICD-10-PCS | Mod: 26,,, | Performed by: RADIOLOGY

## 2019-07-15 DIAGNOSIS — R42 DIZZINESS AND GIDDINESS: Primary | ICD-10-CM

## 2019-07-17 ENCOUNTER — LAB VISIT (OUTPATIENT)
Dept: LAB | Facility: HOSPITAL | Age: 84
End: 2019-07-17
Attending: OTOLARYNGOLOGY
Payer: MEDICARE

## 2019-07-17 DIAGNOSIS — R42 DIZZINESS AND GIDDINESS: ICD-10-CM

## 2019-07-17 LAB
BUN SERPL-MCNC: 15 MG/DL (ref 8–23)
CREAT SERPL-MCNC: 1.4 MG/DL (ref 0.5–1.4)
EST. GFR  (AFRICAN AMERICAN): 51.9 ML/MIN/1.73 M^2
EST. GFR  (NON AFRICAN AMERICAN): 44.9 ML/MIN/1.73 M^2

## 2019-07-17 PROCEDURE — 82565 ASSAY OF CREATININE: CPT | Mod: PO

## 2019-07-17 PROCEDURE — 84520 ASSAY OF UREA NITROGEN: CPT | Mod: PO

## 2019-07-17 PROCEDURE — 36415 COLL VENOUS BLD VENIPUNCTURE: CPT | Mod: PO

## 2019-07-25 ENCOUNTER — HOSPITAL ENCOUNTER (OUTPATIENT)
Dept: RADIOLOGY | Facility: HOSPITAL | Age: 84
Discharge: HOME | End: 2019-07-25
Attending: OTOLARYNGOLOGY
Payer: MEDICARE

## 2019-07-25 DIAGNOSIS — R42 DIZZINESS AND GIDDINESS: ICD-10-CM

## 2019-07-25 PROCEDURE — 70553 MRI BRAIN W WO CONTRAST: ICD-10-PCS | Mod: 26,,, | Performed by: RADIOLOGY

## 2019-07-25 PROCEDURE — 70553 MRI BRAIN STEM W/O & W/DYE: CPT | Mod: 26,,, | Performed by: RADIOLOGY

## 2019-07-25 PROCEDURE — A9585 GADOBUTROL INJECTION: HCPCS | Mod: PO | Performed by: RADIOLOGY

## 2019-07-25 PROCEDURE — 25500020 PHARM REV CODE 255: Mod: PO | Performed by: RADIOLOGY

## 2019-07-25 PROCEDURE — 70553 MRI BRAIN STEM W/O & W/DYE: CPT | Mod: TC,PO

## 2019-07-25 RX ORDER — GADOBUTROL 604.72 MG/ML
8 INJECTION INTRAVENOUS
Status: COMPLETED | OUTPATIENT
Start: 2019-07-25 | End: 2019-07-25

## 2019-07-25 RX ADMIN — GADOBUTROL 8 ML: 604.72 INJECTION INTRAVENOUS at 03:07

## 2019-07-31 ENCOUNTER — HOSPITAL ENCOUNTER (OUTPATIENT)
Dept: RADIOLOGY | Facility: HOSPITAL | Age: 84
Discharge: HOME | End: 2019-07-31
Attending: INTERNAL MEDICINE
Payer: MEDICARE

## 2019-07-31 DIAGNOSIS — Z01.818 PREOP EXAMINATION: Primary | ICD-10-CM

## 2019-07-31 DIAGNOSIS — Z01.818 PREOP EXAMINATION: ICD-10-CM

## 2019-07-31 PROCEDURE — 71046 X-RAY EXAM CHEST 2 VIEWS: CPT | Mod: 26,,, | Performed by: RADIOLOGY

## 2019-07-31 PROCEDURE — 71046 X-RAY EXAM CHEST 2 VIEWS: CPT | Mod: TC,PO

## 2019-07-31 PROCEDURE — 71046 XR CHEST PA AND LATERAL: ICD-10-PCS | Mod: 26,,, | Performed by: RADIOLOGY

## 2020-08-04 ENCOUNTER — LAB VISIT (OUTPATIENT)
Dept: LAB | Facility: HOSPITAL | Age: 85
End: 2020-08-04
Attending: INTERNAL MEDICINE
Payer: MEDICARE

## 2020-08-04 DIAGNOSIS — R06.2 WHEEZING: Primary | ICD-10-CM

## 2020-08-04 LAB
ALBUMIN SERPL BCP-MCNC: 3 G/DL (ref 3.5–5.2)
ALP SERPL-CCNC: 66 U/L (ref 55–135)
ALT SERPL W/O P-5'-P-CCNC: 17 U/L (ref 10–44)
ANION GAP SERPL CALC-SCNC: 8 MMOL/L (ref 8–16)
AST SERPL-CCNC: 33 U/L (ref 10–40)
BILIRUB SERPL-MCNC: 1.1 MG/DL (ref 0.1–1)
BUN SERPL-MCNC: 20 MG/DL (ref 8–23)
CALCIUM SERPL-MCNC: 8.5 MG/DL (ref 8.7–10.5)
CHLORIDE SERPL-SCNC: 103 MMOL/L (ref 95–110)
CO2 SERPL-SCNC: 29 MMOL/L (ref 23–29)
CREAT SERPL-MCNC: 1.2 MG/DL (ref 0.5–1.4)
EST. GFR  (AFRICAN AMERICAN): >60 ML/MIN/1.73 M^2
EST. GFR  (NON AFRICAN AMERICAN): 53.7 ML/MIN/1.73 M^2
GLUCOSE SERPL-MCNC: 139 MG/DL (ref 70–110)
POTASSIUM SERPL-SCNC: 4 MMOL/L (ref 3.5–5.1)
PROT SERPL-MCNC: 7.6 G/DL (ref 6–8.4)
SODIUM SERPL-SCNC: 140 MMOL/L (ref 136–145)

## 2020-08-04 PROCEDURE — 80053 COMPREHEN METABOLIC PANEL: CPT | Mod: PO

## 2020-08-04 PROCEDURE — 36415 COLL VENOUS BLD VENIPUNCTURE: CPT | Mod: PO

## 2020-08-21 ENCOUNTER — LAB VISIT (OUTPATIENT)
Dept: LAB | Facility: HOSPITAL | Age: 85
End: 2020-08-21
Attending: INTERNAL MEDICINE
Payer: MEDICARE

## 2020-08-21 DIAGNOSIS — C22.0 HCC (HEPATOCELLULAR CARCINOMA): ICD-10-CM

## 2020-08-21 LAB
ANION GAP SERPL CALC-SCNC: 6 MMOL/L (ref 8–16)
BUN SERPL-MCNC: 21 MG/DL (ref 8–23)
CALCIUM SERPL-MCNC: 8.8 MG/DL (ref 8.7–10.5)
CHLORIDE SERPL-SCNC: 104 MMOL/L (ref 95–110)
CO2 SERPL-SCNC: 28 MMOL/L (ref 23–29)
CREAT SERPL-MCNC: 1.1 MG/DL (ref 0.5–1.4)
EST. GFR  (AFRICAN AMERICAN): >60 ML/MIN/1.73 M^2
EST. GFR  (NON AFRICAN AMERICAN): 59.6 ML/MIN/1.73 M^2
GLUCOSE SERPL-MCNC: 98 MG/DL (ref 70–110)
POTASSIUM SERPL-SCNC: 4.4 MMOL/L (ref 3.5–5.1)
SODIUM SERPL-SCNC: 138 MMOL/L (ref 136–145)

## 2020-08-21 PROCEDURE — 36415 COLL VENOUS BLD VENIPUNCTURE: CPT | Mod: PO

## 2020-08-21 PROCEDURE — 80048 BASIC METABOLIC PNL TOTAL CA: CPT | Mod: PO

## 2020-10-14 ENCOUNTER — CLINICAL SUPPORT (OUTPATIENT)
Dept: OTHER | Facility: CLINIC | Age: 85
End: 2020-10-14

## 2020-10-14 DIAGNOSIS — Z00.8 ENCOUNTER FOR OTHER GENERAL EXAMINATION: ICD-10-CM

## 2020-10-16 VITALS — BODY MASS INDEX: 29.16 KG/M2 | HEIGHT: 67 IN

## 2020-10-16 LAB
GLUCOSE SERPL-MCNC: 168 MG/DL (ref 60–140)
HDLC SERPL-MCNC: 41 MG/DL
POC CHOLESTEROL, LDL (DOCK): 104 MG/DL
POC CHOLESTEROL, TOTAL: 165 MG/DL
TRIGL SERPL-MCNC: 98 MG/DL

## 2020-12-30 ENCOUNTER — TELEPHONE (OUTPATIENT)
Dept: OTHER | Facility: CLINIC | Age: 85
End: 2020-12-30

## 2022-03-06 ENCOUNTER — HOSPITAL ENCOUNTER (EMERGENCY)
Facility: HOSPITAL | Age: 87
Discharge: HOME | End: 2022-03-06
Attending: EMERGENCY MEDICINE
Payer: MEDICARE

## 2022-03-06 VITALS
DIASTOLIC BLOOD PRESSURE: 86 MMHG | RESPIRATION RATE: 22 BRPM | WEIGHT: 178.38 LBS | OXYGEN SATURATION: 96 % | TEMPERATURE: 98 F | BODY MASS INDEX: 27.93 KG/M2 | HEART RATE: 103 BPM | SYSTOLIC BLOOD PRESSURE: 146 MMHG

## 2022-03-06 DIAGNOSIS — R06.02 SOB (SHORTNESS OF BREATH): Primary | ICD-10-CM

## 2022-03-06 DIAGNOSIS — I50.9 CONGESTIVE HEART FAILURE, UNSPECIFIED HF CHRONICITY, UNSPECIFIED HEART FAILURE TYPE: ICD-10-CM

## 2022-03-06 DIAGNOSIS — R06.02 SHORTNESS OF BREATH: ICD-10-CM

## 2022-03-06 DIAGNOSIS — R07.9 CHEST PAIN: ICD-10-CM

## 2022-03-06 LAB
ALBUMIN SERPL BCP-MCNC: 3.8 G/DL (ref 3.5–5.2)
ALP SERPL-CCNC: 68 U/L (ref 55–135)
ALT SERPL W/O P-5'-P-CCNC: 11 U/L (ref 10–44)
ANION GAP SERPL CALC-SCNC: 8 MMOL/L (ref 8–16)
AST SERPL-CCNC: 20 U/L (ref 10–40)
BASOPHILS # BLD AUTO: 0.02 K/UL (ref 0–0.2)
BASOPHILS NFR BLD: 0.4 % (ref 0–1.9)
BILIRUB SERPL-MCNC: 2 MG/DL (ref 0.1–1)
BNP SERPL-MCNC: 457 PG/ML (ref 0–99)
BUN SERPL-MCNC: 13 MG/DL (ref 8–23)
CALCIUM SERPL-MCNC: 8.9 MG/DL (ref 8.7–10.5)
CHLORIDE SERPL-SCNC: 105 MMOL/L (ref 95–110)
CK MB SERPL-MCNC: 2.8 NG/ML (ref 0.1–6.5)
CK MB SERPL-RTO: 4.1 % (ref 0–5)
CK SERPL-CCNC: 68 U/L (ref 20–200)
CK SERPL-CCNC: 68 U/L (ref 20–200)
CO2 SERPL-SCNC: 25 MMOL/L (ref 23–29)
CREAT SERPL-MCNC: 1 MG/DL (ref 0.5–1.4)
CTP QC/QA: YES
DIFFERENTIAL METHOD: ABNORMAL
EOSINOPHIL # BLD AUTO: 0.1 K/UL (ref 0–0.5)
EOSINOPHIL NFR BLD: 1.7 % (ref 0–8)
ERYTHROCYTE [DISTWIDTH] IN BLOOD BY AUTOMATED COUNT: 15.1 % (ref 11.5–14.5)
EST. GFR  (AFRICAN AMERICAN): >60 ML/MIN/1.73 M^2
EST. GFR  (NON AFRICAN AMERICAN): >60 ML/MIN/1.73 M^2
GLUCOSE SERPL-MCNC: 119 MG/DL (ref 70–110)
HCT VFR BLD AUTO: 37.6 % (ref 40–54)
HGB BLD-MCNC: 11.8 G/DL (ref 14–18)
IMM GRANULOCYTES # BLD AUTO: 0.02 K/UL (ref 0–0.04)
IMM GRANULOCYTES NFR BLD AUTO: 0.4 % (ref 0–0.5)
LYMPHOCYTES # BLD AUTO: 0.9 K/UL (ref 1–4.8)
LYMPHOCYTES NFR BLD: 18.4 % (ref 18–48)
MCH RBC QN AUTO: 27.2 PG (ref 27–31)
MCHC RBC AUTO-ENTMCNC: 31.4 G/DL (ref 32–36)
MCV RBC AUTO: 87 FL (ref 82–98)
MONOCYTES # BLD AUTO: 0.5 K/UL (ref 0.3–1)
MONOCYTES NFR BLD: 10.4 % (ref 4–15)
NEUTROPHILS # BLD AUTO: 3.3 K/UL (ref 1.8–7.7)
NEUTROPHILS NFR BLD: 68.7 % (ref 38–73)
NRBC BLD-RTO: 0 /100 WBC
PLATELET # BLD AUTO: 128 K/UL (ref 150–450)
PMV BLD AUTO: 11.9 FL (ref 9.2–12.9)
POTASSIUM SERPL-SCNC: 4.4 MMOL/L (ref 3.5–5.1)
PROT SERPL-MCNC: 7.5 G/DL (ref 6–8.4)
RBC # BLD AUTO: 4.34 M/UL (ref 4.6–6.2)
SARS-COV-2 RDRP RESP QL NAA+PROBE: NEGATIVE
SODIUM SERPL-SCNC: 138 MMOL/L (ref 136–145)
TROPONIN I SERPL DL<=0.01 NG/ML-MCNC: 0.14 NG/ML (ref 0–0.03)
WBC # BLD AUTO: 4.83 K/UL (ref 3.9–12.7)

## 2022-03-06 PROCEDURE — 93010 EKG 12-LEAD: ICD-10-PCS | Mod: ,,, | Performed by: INTERNAL MEDICINE

## 2022-03-06 PROCEDURE — 82553 CREATINE MB FRACTION: CPT | Mod: ER | Performed by: EMERGENCY MEDICINE

## 2022-03-06 PROCEDURE — 96374 THER/PROPH/DIAG INJ IV PUSH: CPT | Mod: ER

## 2022-03-06 PROCEDURE — 80053 COMPREHEN METABOLIC PANEL: CPT | Mod: ER | Performed by: EMERGENCY MEDICINE

## 2022-03-06 PROCEDURE — 85025 COMPLETE CBC W/AUTO DIFF WBC: CPT | Mod: ER | Performed by: EMERGENCY MEDICINE

## 2022-03-06 PROCEDURE — 84484 ASSAY OF TROPONIN QUANT: CPT | Mod: ER | Performed by: EMERGENCY MEDICINE

## 2022-03-06 PROCEDURE — 63600175 PHARM REV CODE 636 W HCPCS: Mod: ER | Performed by: EMERGENCY MEDICINE

## 2022-03-06 PROCEDURE — 99285 EMERGENCY DEPT VISIT HI MDM: CPT | Mod: 25,ER

## 2022-03-06 PROCEDURE — U0002 COVID-19 LAB TEST NON-CDC: HCPCS | Mod: ER | Performed by: EMERGENCY MEDICINE

## 2022-03-06 PROCEDURE — 93005 ELECTROCARDIOGRAM TRACING: CPT | Mod: ER

## 2022-03-06 PROCEDURE — 93010 ELECTROCARDIOGRAM REPORT: CPT | Mod: ,,, | Performed by: INTERNAL MEDICINE

## 2022-03-06 PROCEDURE — 83880 ASSAY OF NATRIURETIC PEPTIDE: CPT | Mod: ER | Performed by: EMERGENCY MEDICINE

## 2022-03-06 RX ORDER — FUROSEMIDE 40 MG/1
40 TABLET ORAL 2 TIMES DAILY
Qty: 60 TABLET | Refills: 1 | Status: SHIPPED | OUTPATIENT
Start: 2022-03-06

## 2022-03-06 RX ORDER — FUROSEMIDE 10 MG/ML
60 INJECTION INTRAMUSCULAR; INTRAVENOUS
Status: COMPLETED | OUTPATIENT
Start: 2022-03-06 | End: 2022-03-06

## 2022-03-06 RX ORDER — DEXTROMETHORPHAN HBR AND PYRILAMINE MALEATE 30; 30 MG/1; MG/1
1 TABLET ORAL EVERY 6 HOURS PRN
COMMUNITY
Start: 2022-02-21

## 2022-03-06 RX ORDER — GLIPIZIDE 2.5 MG/1
2.5 TABLET, EXTENDED RELEASE ORAL DAILY
COMMUNITY
Start: 2022-02-07

## 2022-03-06 RX ORDER — FUROSEMIDE 10 MG/ML
20 INJECTION INTRAMUSCULAR; INTRAVENOUS
Status: DISCONTINUED | OUTPATIENT
Start: 2022-03-06 | End: 2022-03-06

## 2022-03-06 RX ORDER — MECLIZINE HCL 12.5 MG 12.5 MG/1
12.5 TABLET ORAL 3 TIMES DAILY PRN
COMMUNITY
Start: 2021-06-01

## 2022-03-06 RX ORDER — APIXABAN 5 MG/1
5 TABLET, FILM COATED ORAL 2 TIMES DAILY
COMMUNITY
Start: 2022-02-07

## 2022-03-06 RX ORDER — DOXYCYCLINE 50 MG/1
100 CAPSULE ORAL EVERY 12 HOURS
COMMUNITY

## 2022-03-06 RX ORDER — SAXAGLIPTIN 2.5 MG/1
2.5 TABLET, FILM COATED ORAL DAILY
COMMUNITY
Start: 2022-02-07

## 2022-03-06 RX ORDER — FUROSEMIDE 80 MG/1
80 TABLET ORAL 2 TIMES DAILY
COMMUNITY
Start: 2021-11-22 | End: 2022-03-06 | Stop reason: SDUPTHER

## 2022-03-06 RX ADMIN — FUROSEMIDE 60 MG: 10 INJECTION, SOLUTION INTRAMUSCULAR; INTRAVENOUS at 02:03

## 2022-03-06 NOTE — DISCHARGE INSTRUCTIONS
Take all of your medications as prescribed, do not skip doses.  In particular, I am putting you back on Lasix but at a lower dose than before.  Take the new prescription with you and see your doctor this week.

## 2022-03-06 NOTE — ED PROVIDER NOTES
Encounter Date: 3/6/2022       History     Chief Complaint   Patient presents with    Shortness of Breath     For a week, feeling congested and coughing.      He is here with family for various complaints.  Underlying history of coronary bypass, diabetes, hypertension, hard of hearing, atrial fibrillation, others as listed.  Symptoms for about a week, he feels some nonspecific sense of congestion with some sore throat and coughing, mild mucus production, mild sense dyspnea and dyspnea on exertion.  No actual chest pain.  He feels a little lightheaded and dizzy at times.  No fever, chills, sweats, urinary complaints, palpitations.  He does have some chronic complaints of dizziness and lightheadedness for which he saw ENT last week, has been taking any medication, cap per on DMT, so far he has not noticed improvement.  He was recommended for an MRI but this has not yet been scheduled.  Is sick no recent change in medications.  Has had 2 doses of COVID-19 vaccine, never tested positive.  No other complaints.  On arrival, orthostatics notable for a systolic blood pressure drop of about 25 points, heart rate unchanged.    The history is provided by the patient, the spouse and a relative. No  was used.     Review of patient's allergies indicates:   Allergen Reactions    Prednisone      Hiccups     Past Medical History:   Diagnosis Date    Coronary artery disease     Depression     Diabetes mellitus     Hearing aid worn     High cholesterol     Hypertension     Sleep apnea      Past Surgical History:   Procedure Laterality Date    CORONARY ARTERY BYPASS GRAFT      2009    TESTICLE SURGERY Left     removed the left testicle    TOTAL KNEE ARTHROPLASTY Left      History reviewed. No pertinent family history.  Social History     Tobacco Use    Smoking status: Never Smoker    Smokeless tobacco: Never Used   Substance Use Topics    Alcohol use: No    Drug use: No     Review of Systems    Constitutional: Negative for chills and fever.   HENT: Positive for congestion. Negative for facial swelling, nosebleeds and sinus pressure.    Eyes: Negative for pain and redness.   Respiratory: Positive for cough and shortness of breath. Negative for chest tightness and wheezing.    Cardiovascular: Negative for chest pain, palpitations and leg swelling.   Gastrointestinal: Negative for abdominal distention, abdominal pain, diarrhea, nausea and vomiting.   Endocrine: Negative for cold intolerance, polydipsia and polyphagia.   Genitourinary: Negative for difficulty urinating, dysuria, frequency and hematuria.   Musculoskeletal: Negative for arthralgias, back pain, myalgias and neck pain.   Skin: Negative for color change and rash.   Neurological: Positive for weakness. Negative for dizziness, numbness and headaches.   Hematological: Negative for adenopathy. Does not bruise/bleed easily.   Psychiatric/Behavioral: Negative for agitation and behavioral problems.   All other systems reviewed and are negative.      Physical Exam     Initial Vitals [03/06/22 1055]   BP Pulse Resp Temp SpO2   (!) 152/86 101 (!) 24 97.9 °F (36.6 °C) 97 %      MAP       --         Physical Exam    Nursing note and vitals reviewed.  Constitutional: He appears well-developed and well-nourished. He is not diaphoretic. No distress.   [place]   HENT:   Head: Normocephalic and atraumatic.   Mouth/Throat: Oropharynx is clear and moist. No oropharyngeal exudate.   Eyes: Conjunctivae and EOM are normal. Pupils are equal, round, and reactive to light. Right eye exhibits no discharge. Left eye exhibits no discharge. No scleral icterus.   Neck: Neck supple. No thyromegaly present. No tracheal deviation present. No JVD present.   Normal range of motion.  Cardiovascular: Normal heart sounds. Exam reveals no gallop and no friction rub.    No murmur heard.  IRRR/ mild tachycardia   Pulmonary/Chest: No respiratory distress. He has no wheezes. He has no  rhonchi. He has no rales. He exhibits no tenderness.   Decreased air entry throughout   Abdominal: Abdomen is soft. Bowel sounds are normal. He exhibits no distension and no mass. There is no abdominal tenderness. There is no rebound and no guarding.   Musculoskeletal:         General: No tenderness or edema. Normal range of motion.      Cervical back: Normal range of motion and neck supple.     Lymphadenopathy:     He has no cervical adenopathy.   Neurological: He is alert and oriented to person, place, and time. He has normal strength. No cranial nerve deficit.   Skin: Skin is warm and dry. No rash noted. No erythema.   Psychiatric: He has a normal mood and affect. His behavior is normal. Judgment and thought content normal.         ED Course   Procedures  Labs Reviewed   CBC W/ AUTO DIFFERENTIAL - Abnormal; Notable for the following components:       Result Value    RBC 4.34 (*)     Hemoglobin 11.8 (*)     Hematocrit 37.6 (*)     MCHC 31.4 (*)     RDW 15.1 (*)     Platelets 128 (*)     Lymph # 0.9 (*)     All other components within normal limits   COMPREHENSIVE METABOLIC PANEL - Abnormal; Notable for the following components:    Glucose 119 (*)     Total Bilirubin 2.0 (*)     All other components within normal limits   TROPONIN I - Abnormal; Notable for the following components:    Troponin I 0.145 (*)     All other components within normal limits   B-TYPE NATRIURETIC PEPTIDE - Abnormal; Notable for the following components:     (*)     All other components within normal limits   CK   CK-MB   SARS-COV-2 RDRP GENE    Narrative:     This test utilizes isothermal nucleic acid amplification   technology to detect the SARS-CoV-2 RdRp nucleic acid segment.   The analytical sensitivity (limit of detection) is 125 genome   equivalents/mL.   A POSITIVE result implies infection with the SARS-CoV-2 virus;   the patient is presumed to be contagious.     A NEGATIVE result means that SARS-CoV-2 nucleic acids are not    present above the limit of detection. A NEGATIVE result should be   treated as presumptive. It does not rule out the possibility of   COVID-19 and should not be the sole basis for treatment decisions.   If COVID-19 is strongly suspected based on clinical and exposure   history, re-testing using an alternate molecular assay should be   considered.   This test is only for use under the Food and Drug   Administration s Emergency Use Authorization (EUA).   Commercial kits are provided by [employer].   Performance characteristics of the EUA have been independently   verified by [facility] Department of   Pathology and Laboratory Medicine.   _________________________________________________________________   The authorized Fact Sheet for Healthcare Providers and the authorized Fact   Sheet for Patients of the ID NOW COVID-19 are available on the FDA   website:     https://www.fda.gov/media/514554/download  https://www.fda.gov/media/503733/download            EKG Readings: (Independently Interpreted)   Rhythm: Atrial Fibrillation. Heart Rate: 108.   Atrial fibrillation with rapid ventricular response, 108 beats per minute, right bundle branch block, inferior infarction, anterior infarction, no acute ischemic change.       Imaging Results          X-Ray Chest PA And Lateral (Final result)  Result time 03/06/22 13:32:12    Final result by Vikas Chaidez Jr., MD (03/06/22 13:32:12)                 Impression:      Development of pulmonary edema with small effusions, likely cardiogenic.      Electronically signed by: Vikas Chaidez Jr., MD  Date:    03/06/2022  Time:    13:32             Narrative:    EXAMINATION:  XR CHEST PA AND LATERAL    CLINICAL HISTORY:  Chest Pain;    TECHNIQUE:  PA and lateral views of the chest.    COMPARISON:  Prior radiograph from 07/31/2019.    FINDINGS:  Prior median sternotomy.  Hazy indistinctness of the lung markings inferiorly with small bilateral pleural effusions.  This  is new compared to prior.    The heart is enlarged.  The hilar and mediastinal contours are normal.    No concerning osseous findings.                              1:51 PM Apparently only partially compliant with medications, reports he is not taking aspirin, Lipitor, Lexapro, or Lasix as prescribed.    2:07 PM Clinically stable.  Reviewed results with patient and family in detail.  Offered hospital admission, they prefer an outpatient approach and I do believe he is stable for this.  Previously on Lasix 80 mg b.i.d. but has not been taking it at all.  Will resume at 40 mg b.i.d. and recommend office follow-up this week for recheck, return to ER if worse.  Patient agrees to resume compliance with medications and family will assist.  Stable for outpatient management.       Medications   furosemide injection 60 mg (has no administration in time range)                          Clinical Impression:   Final diagnoses:  [R07.9] Chest pain  [R06.02] SOB (shortness of breath) (Primary)  [I50.9] Congestive heart failure, unspecified HF chronicity, unspecified heart failure type          ED Disposition Condition    Discharge Stable        ED Prescriptions     Medication Sig Dispense Start Date End Date Auth. Provider    furosemide (LASIX) 40 MG tablet Take 1 tablet (40 mg total) by mouth 2 (two) times daily. 60 tablet 3/6/2022  Ruddy Saba MD        Follow-up Information     Follow up With Specialties Details Why Contact Info    Trey Eden MD Internal Medicine In 3 days  [id number] [facility]  [place] [address]  [phone]      [facility] - Emergency Dept Emergency Medicine  As needed [id number]  [place] [id number] [phone]           Ruddy Saba MD  03/06/22 1063

## 2022-09-12 NOTE — ED NOTES
.    This encounter was opened in error. Please disregard.   Spoke w/ Naya, nursing supervisor at [facility] regarding status of patient transfer. States still waiting to hear from intake and should be getting answer soon.

## 2024-05-20 ENCOUNTER — HOSPITAL ENCOUNTER (OUTPATIENT)
Dept: RADIOLOGY | Facility: HOSPITAL | Age: 89
Discharge: HOME | End: 2024-05-20
Attending: PHYSICIAN ASSISTANT
Payer: MEDICARE

## 2024-05-20 DIAGNOSIS — M79.662 PAIN OF LEFT LOWER LEG: ICD-10-CM

## 2024-05-20 DIAGNOSIS — M79.662 PAIN OF LEFT CALF: Primary | ICD-10-CM

## 2024-05-20 DIAGNOSIS — M79.662 PAIN OF LEFT LOWER LEG: Primary | ICD-10-CM

## 2024-05-20 PROCEDURE — 93971 EXTREMITY STUDY: CPT | Mod: TC,PO,LT

## 2024-05-20 PROCEDURE — 93971 EXTREMITY STUDY: CPT | Mod: 26,LT,, | Performed by: STUDENT IN AN ORGANIZED HEALTH CARE EDUCATION/TRAINING PROGRAM
